# Patient Record
Sex: FEMALE | Race: WHITE
[De-identification: names, ages, dates, MRNs, and addresses within clinical notes are randomized per-mention and may not be internally consistent; named-entity substitution may affect disease eponyms.]

---

## 2021-02-22 PROBLEM — Z00.00 ENCOUNTER FOR PREVENTIVE HEALTH EXAMINATION: Status: ACTIVE | Noted: 2021-02-22

## 2021-02-23 ENCOUNTER — APPOINTMENT (OUTPATIENT)
Dept: OTOLARYNGOLOGY | Facility: CLINIC | Age: 66
End: 2021-02-23
Payer: COMMERCIAL

## 2021-02-23 VITALS
WEIGHT: 180 LBS | TEMPERATURE: 97.1 F | HEIGHT: 62 IN | HEART RATE: 66 BPM | BODY MASS INDEX: 33.13 KG/M2 | DIASTOLIC BLOOD PRESSURE: 78 MMHG | SYSTOLIC BLOOD PRESSURE: 130 MMHG

## 2021-02-23 DIAGNOSIS — H60.90 UNSPECIFIED OTITIS EXTERNA, UNSPECIFIED EAR: ICD-10-CM

## 2021-02-23 DIAGNOSIS — Z87.891 PERSONAL HISTORY OF NICOTINE DEPENDENCE: ICD-10-CM

## 2021-02-23 DIAGNOSIS — Z83.3 FAMILY HISTORY OF DIABETES MELLITUS: ICD-10-CM

## 2021-02-23 PROCEDURE — 99072 ADDL SUPL MATRL&STAF TM PHE: CPT

## 2021-02-23 PROCEDURE — 99203 OFFICE O/P NEW LOW 30 MIN: CPT | Mod: 25

## 2021-02-23 PROCEDURE — 69210 REMOVE IMPACTED EAR WAX UNI: CPT

## 2021-02-23 NOTE — HISTORY OF PRESENT ILLNESS
[de-identified] : 65 y.o female presents with clogged sensation of both ears. Typically need wax removed every few months. Has tried using debrox in the past for it.

## 2021-02-23 NOTE — PHYSICAL EXAM
[de-identified] : DAISY CERUMEN IMPACTION REMOVED/ CANAL IRRITATED MOSTLY ON THE RIGHT/ MINIMAL RESIDUAL WAX [Normal] : no abnormal secretions

## 2022-03-01 ENCOUNTER — APPOINTMENT (OUTPATIENT)
Dept: OTOLARYNGOLOGY | Facility: CLINIC | Age: 67
End: 2022-03-01
Payer: COMMERCIAL

## 2022-03-01 VITALS
SYSTOLIC BLOOD PRESSURE: 121 MMHG | BODY MASS INDEX: 29.44 KG/M2 | DIASTOLIC BLOOD PRESSURE: 77 MMHG | HEIGHT: 62 IN | HEART RATE: 111 BPM | WEIGHT: 160 LBS

## 2022-03-01 DIAGNOSIS — H61.20 IMPACTED CERUMEN, UNSPECIFIED EAR: ICD-10-CM

## 2022-03-01 DIAGNOSIS — H91.10 PRESBYCUSIS, UNSPECIFIED EAR: ICD-10-CM

## 2022-03-01 PROCEDURE — 92557 COMPREHENSIVE HEARING TEST: CPT

## 2022-03-01 PROCEDURE — 92567 TYMPANOMETRY: CPT

## 2022-03-01 PROCEDURE — 99213 OFFICE O/P EST LOW 20 MIN: CPT | Mod: 25

## 2022-03-01 NOTE — DATA REVIEWED
[de-identified] : \par -TYMPS: TYPE A AU (ETF ABNORMAL AU)\par -ESSENTIALLY MILD SNHL 250-6000 HZ, MODERATE HL AT 8000 HZ AU\par RECS: 1) ENT F/U 2)RE-EVAL AS PER MD 3)CONSIDER AMPLIFICATION IF PERCEIVING DIFFICULTY

## 2022-03-01 NOTE — ASSESSMENT
[FreeTextEntry1] : avoid q tips\par  early presbycausia; type a ana\par cleared for HBO treatment\par f/u 6 months prn

## 2023-11-16 ENCOUNTER — APPOINTMENT (OUTPATIENT)
Dept: SURGERY | Facility: CLINIC | Age: 68
End: 2023-11-16
Payer: MEDICARE

## 2023-11-16 VITALS
WEIGHT: 140 LBS | OXYGEN SATURATION: 99 % | DIASTOLIC BLOOD PRESSURE: 76 MMHG | HEART RATE: 68 BPM | BODY MASS INDEX: 25.76 KG/M2 | HEIGHT: 62 IN | RESPIRATION RATE: 18 BRPM | SYSTOLIC BLOOD PRESSURE: 160 MMHG

## 2023-11-16 PROCEDURE — 99213 OFFICE O/P EST LOW 20 MIN: CPT

## 2024-01-09 ENCOUNTER — APPOINTMENT (OUTPATIENT)
Dept: GASTROENTEROLOGY | Facility: CLINIC | Age: 69
End: 2024-01-09
Payer: MEDICARE

## 2024-01-09 VITALS
HEIGHT: 62 IN | SYSTOLIC BLOOD PRESSURE: 178 MMHG | HEART RATE: 69 BPM | DIASTOLIC BLOOD PRESSURE: 79 MMHG | OXYGEN SATURATION: 100 %

## 2024-01-09 DIAGNOSIS — K86.89 OTHER SPECIFIED DISEASES OF PANCREAS: ICD-10-CM

## 2024-01-09 DIAGNOSIS — R93.5 ABNORMAL FINDINGS ON DIAGNOSTIC IMAGING OF OTHER ABDOMINAL REGIONS, INCLUDING RETROPERITONEUM: ICD-10-CM

## 2024-01-09 DIAGNOSIS — Z83.79 FAMILY HISTORY OF OTHER DISEASES OF THE DIGESTIVE SYSTEM: ICD-10-CM

## 2024-01-09 DIAGNOSIS — R19.7 DIARRHEA, UNSPECIFIED: ICD-10-CM

## 2024-01-09 DIAGNOSIS — Z12.11 ENCOUNTER FOR SCREENING FOR MALIGNANT NEOPLASM OF COLON: ICD-10-CM

## 2024-01-09 DIAGNOSIS — R15.9 FULL INCONTINENCE OF FECES: ICD-10-CM

## 2024-01-09 DIAGNOSIS — Z86.010 PERSONAL HISTORY OF COLONIC POLYPS: ICD-10-CM

## 2024-01-09 PROCEDURE — 99203 OFFICE O/P NEW LOW 30 MIN: CPT

## 2024-01-09 RX ORDER — AMLODIPINE BESYLATE 5 MG/1
5 TABLET ORAL
Refills: 0 | Status: ACTIVE | COMMUNITY

## 2024-01-09 RX ORDER — SIMVASTATIN 80 MG/1
TABLET, FILM COATED ORAL
Refills: 0 | Status: ACTIVE | COMMUNITY

## 2024-01-09 RX ORDER — NEOMYCIN AND POLYMYXIN B SULFATES AND HYDROCORTISONE OTIC 10; 3.5; 1 MG/ML; MG/ML; [USP'U]/ML
3.5-10000-1 SUSPENSION AURICULAR (OTIC) 4 TIMES DAILY
Qty: 1 | Refills: 2 | Status: DISCONTINUED | COMMUNITY
Start: 2021-02-23 | End: 2024-01-09

## 2024-01-09 RX ORDER — INSULIN ASPART 100 [IU]/ML
INJECTION, SOLUTION INTRAVENOUS; SUBCUTANEOUS
Refills: 0 | Status: DISCONTINUED | COMMUNITY
End: 2024-01-09

## 2024-01-09 RX ORDER — INSULIN LISPRO 100 [IU]/ML
INJECTION, SOLUTION INTRAVENOUS; SUBCUTANEOUS
Refills: 0 | Status: ACTIVE | COMMUNITY

## 2024-01-09 RX ORDER — FUROSEMIDE 80 MG/1
TABLET ORAL
Refills: 0 | Status: ACTIVE | COMMUNITY

## 2024-01-09 RX ORDER — DULOXETINE HYDROCHLORIDE 30 MG/1
30 CAPSULE, DELAYED RELEASE ORAL
Refills: 0 | Status: ACTIVE | COMMUNITY

## 2024-01-09 RX ORDER — METOPROLOL TARTRATE 75 MG/1
TABLET, FILM COATED ORAL
Refills: 0 | Status: ACTIVE | COMMUNITY

## 2024-01-11 DIAGNOSIS — A04.72 ENTEROCOLITIS DUE TO CLOSTRIDIUM DIFFICILE, NOT SPECIFIED AS RECURRENT: ICD-10-CM

## 2024-01-11 LAB
CDIFF BY PCR: DETECTED
GI PCR PANEL: NOT DETECTED

## 2024-01-11 RX ORDER — VANCOMYCIN HYDROCHLORIDE 125 MG/1
125 CAPSULE ORAL 4 TIMES DAILY
Qty: 40 | Refills: 0 | Status: ACTIVE | COMMUNITY
Start: 2024-01-11 | End: 1900-01-01

## 2024-01-11 NOTE — ADDENDUM
[FreeTextEntry1] : Plan:  Recommend that the pt complete stool tests (i.e. Pancreatic Elastase, Lactoferrin, Calprotectin, C. Diff, Qualitative fecal fat, stool FIT, GI PCR) and blood work.   Note entered by Dayna Connors, acting as a scribe for Dr. Chad Levin.

## 2024-01-11 NOTE — HISTORY OF PRESENT ILLNESS
[de-identified] : Chest, Abdomen, and Pelvis, 4/19/22: Findings suspicious for metastatic disease within the liver; cannot exclude pancreatic head lesion. Evidence of chronic pancreatic changes as well. Cholelithiasis. Possible lingular atelectasis. Transition zone within the sigmoid colon with slight thickening. 4.5 x 2.2 cm right adnexal/ovarian cyst. Small fat-containing umbilical hernia. Left lobe thyroid nodule or cyst. Coronary calcifications.  [FreeTextEntry1] : 3/8/23: Findings consistent with chronic pancreatitis and suspected small stones in the pancreatic duct in the head neck junction. Cholelithiasis.

## 2024-01-11 NOTE — REASON FOR VISIT
[Initial Evaluation] : an initial evaluation [Spouse] : spouse [FreeTextEntry1] : diarrhea with fecal incontinence (and abnormal abdominal MRI)

## 2024-01-11 NOTE — ASSESSMENT
[FreeTextEntry1] : 68 year old woman with a Hx of DM 1 (diagnosed at 18 months old; ~ one year s/p above-the-knee amputation of right leg), AVM, and colon polyps presents with postprandial diarrhea with fecal incontinence (can have diarrhea up to 10x/day) since ~ one year ago. Recent MRI results consistent with chronic pancreatitis. Recommend that the pt complete stool tests (i.e. Pancreatic Elastase, Lactoferrin, Calprotectin, C. Diff, Qualitative fecal fat, stool FIT, GI PCR) and blood work.

## 2024-01-11 NOTE — PHYSICAL EXAM
[Hearing Threshold Finger Rub Not Foard] : hearing was normal [Normal Appearance] : the appearance of the neck was normal [No Respiratory Distress] : no respiratory distress [Normal] : heart rate was normal and rhythm regular, normal S1 and S2, no murmurs [None] : no edema [Wheelchair] : Patient in wheelchair [Oriented To Time, Place, And Person] : oriented to person, place, and time [Bowel Sounds] : normal bowel sounds [Abdomen Tenderness] : non-tender [No Masses] : no abdominal mass palpated [Abdomen Soft] : soft [] : no hepatosplenomegaly [Umbilical] : umbilical [de-identified] : Insulin pump and sensor  [de-identified] : Deferred [de-identified] : right AKA

## 2024-01-11 NOTE — CONSULT LETTER
[Dear  ___] : Dear  [unfilled], [Consult Letter:] : I had the pleasure of evaluating your patient, [unfilled]. [Please see my note below.] : Please see my note below. [Consult Closing:] : Thank you very much for allowing me to participate in the care of this patient.  If you have any questions, please do not hesitate to contact me. [Sincerely,] : Sincerely, [FreeTextEntry2] : Dr. Lionel Luz [FreeTextEntry3] : Dr. Chad Levin

## 2024-01-12 RX ORDER — VANCOMYCIN HYDROCHLORIDE 125 MG/1
125 CAPSULE ORAL 4 TIMES DAILY
Qty: 40 | Refills: 0 | Status: ACTIVE | COMMUNITY
Start: 2024-01-12 | End: 1900-01-01

## 2024-01-16 LAB — CALPROTECTIN FECAL: 9 UG/G

## 2024-01-17 LAB
FAT STL QN: NORMAL
FAT STL QN: NORMAL
PANCREATIC ELASTASE, FECAL: <50 CD:794062645

## 2024-01-19 LAB — LACTOFERRIN STL-MCNC: <1 CD:794062635

## 2024-01-30 RX ORDER — PANCRELIPASE 60000; 12000; 38000 [USP'U]/1; [USP'U]/1; [USP'U]/1
12000-38000 CAPSULE, DELAYED RELEASE PELLETS ORAL 4 TIMES DAILY
Qty: 360 | Refills: 3 | Status: ACTIVE | COMMUNITY
Start: 2024-01-30 | End: 1900-01-01

## 2024-04-16 ENCOUNTER — APPOINTMENT (OUTPATIENT)
Dept: GASTROENTEROLOGY | Facility: CLINIC | Age: 69
End: 2024-04-16
Payer: MEDICARE

## 2024-04-16 VITALS
WEIGHT: 145 LBS | RESPIRATION RATE: 18 BRPM | HEART RATE: 55 BPM | DIASTOLIC BLOOD PRESSURE: 83 MMHG | SYSTOLIC BLOOD PRESSURE: 167 MMHG | BODY MASS INDEX: 26.68 KG/M2 | HEIGHT: 62 IN | OXYGEN SATURATION: 98 %

## 2024-04-16 DIAGNOSIS — Q27.30 ARTERIOVENOUS MALFORMATION, SITE UNSPECIFIED: ICD-10-CM

## 2024-04-16 DIAGNOSIS — K86.1 OTHER CHRONIC PANCREATITIS: ICD-10-CM

## 2024-04-16 DIAGNOSIS — K80.20 CALCULUS OF GALLBLADDER W/OUT CHOLECYSTITIS W/OUT OBSTRUCTION: ICD-10-CM

## 2024-04-16 DIAGNOSIS — E10.9 TYPE 1 DIABETES MELLITUS W/OUT COMPLICATIONS: ICD-10-CM

## 2024-04-16 PROCEDURE — 99213 OFFICE O/P EST LOW 20 MIN: CPT

## 2024-04-16 NOTE — ASSESSMENT
[FreeTextEntry1] : Patient is a 68-year-old with history of diabetes mellitus, status post right AKA, history of significant alcohol intake in the past, who had an abdominal pelvic and chest CAT scan at [facility] in 2022 which raise concerns for metastatic liver disease and chronic pancreatitis.  Patient was diagnosed with C. difficile colitis and treated with vancomycin successfully.  She has been placed on Creon for pancreatic insufficiency and her stools have become more formed and her bouts of fecal incontinence have resolved.  Patient was advised to continue Creon and to follow-up in 6 months.  We will try to locate the endoscopy reports performed by Dr. Edison Sevilla at [facility] in 2022.

## 2024-04-16 NOTE — REVIEW OF SYSTEMS
[Abdominal Pain] : no abdominal pain [Vomiting] : no vomiting [Constipation] : no constipation [Diarrhea] : no diarrhea [Heartburn] : no heartburn [Melena (black stool)] : no melena [Bleeding] : no bleeding [Fecal Incontinence (soiling)] : no fecal incontinence [Bloating (gassiness)] : bloating

## 2024-04-16 NOTE — HISTORY OF PRESENT ILLNESS
[FreeTextEntry1] : Colonoscopy performed by Dr. Ronny Vuong performed June 25, 2021 revealed internal hemorrhoids, an 8 mm polyp of the left descending colon, and an 8 mm polyp of the cecum.  Pathology revealed mild focal active colitis of the ascending colon and tubular adenomas x 2 [de-identified] : CAT scan of the chest abdomen and pelvis performed at [facility] on April 19, 2022 revealed findings concerning for metastatic disease to the liver and for chronic pancreatitis

## 2024-04-16 NOTE — PHYSICAL EXAM
[Bowel Sounds] : normal bowel sounds [Abdomen Tenderness] : non-tender [No Masses] : no abdominal mass palpated [Abdomen Soft] : soft [] : no hepatosplenomegaly [Ascites: ___] : no ascites [Rebound Tenderness] : no rebound tenderness [de-identified] : Patient is sitting in a wheelchair [de-identified] : Patient is status post right AKA [de-identified] : Deferred

## 2024-04-16 NOTE — REASON FOR VISIT
[Follow-up] : a follow-up of an existing diagnosis [FreeTextEntry1] : Patient is a 68-year-old female with a history of diabetes mellitus and status post right above-the-knee amputation here for follow-up visit